# Patient Record
Sex: FEMALE | Race: OTHER | HISPANIC OR LATINO
[De-identification: names, ages, dates, MRNs, and addresses within clinical notes are randomized per-mention and may not be internally consistent; named-entity substitution may affect disease eponyms.]

---

## 2019-08-07 ENCOUNTER — RESULT REVIEW (OUTPATIENT)
Age: 54
End: 2019-08-07

## 2019-08-15 ENCOUNTER — FORM ENCOUNTER (OUTPATIENT)
Age: 54
End: 2019-08-15

## 2021-06-11 PROBLEM — Z00.00 ENCOUNTER FOR PREVENTIVE HEALTH EXAMINATION: Status: ACTIVE | Noted: 2021-06-11

## 2021-06-30 DIAGNOSIS — Z80.49 FAMILY HISTORY OF MALIGNANT NEOPLASM OF OTHER GENITAL ORGANS: ICD-10-CM

## 2021-06-30 DIAGNOSIS — Z85.3 PERSONAL HISTORY OF MALIGNANT NEOPLASM OF BREAST: ICD-10-CM

## 2021-06-30 DIAGNOSIS — Z80.41 FAMILY HISTORY OF MALIGNANT NEOPLASM OF OVARY: ICD-10-CM

## 2021-06-30 DIAGNOSIS — Z12.39 ENCOUNTER FOR OTHER SCREENING FOR MALIGNANT NEOPLASM OF BREAST: ICD-10-CM

## 2021-06-30 DIAGNOSIS — Z78.9 OTHER SPECIFIED HEALTH STATUS: ICD-10-CM

## 2021-06-30 DIAGNOSIS — R92.2 INCONCLUSIVE MAMMOGRAM: ICD-10-CM

## 2021-06-30 DIAGNOSIS — Z85.850 PERSONAL HISTORY OF MALIGNANT NEOPLASM OF THYROID: ICD-10-CM

## 2021-07-01 ENCOUNTER — APPOINTMENT (OUTPATIENT)
Dept: BREAST CENTER | Facility: CLINIC | Age: 56
End: 2021-07-01
Payer: COMMERCIAL

## 2021-07-01 ENCOUNTER — NON-APPOINTMENT (OUTPATIENT)
Age: 56
End: 2021-07-01

## 2021-07-01 VITALS
BODY MASS INDEX: 24.55 KG/M2 | WEIGHT: 130 LBS | DIASTOLIC BLOOD PRESSURE: 83 MMHG | HEART RATE: 76 BPM | SYSTOLIC BLOOD PRESSURE: 119 MMHG | HEIGHT: 61 IN | OXYGEN SATURATION: 98 %

## 2021-07-01 DIAGNOSIS — Z85.3 PERSONAL HISTORY OF MALIGNANT NEOPLASM OF BREAST: ICD-10-CM

## 2021-07-01 DIAGNOSIS — Z90.11 ACQUIRED ABSENCE OF RIGHT BREAST AND NIPPLE: ICD-10-CM

## 2021-07-01 DIAGNOSIS — Z80.3 FAMILY HISTORY OF MALIGNANT NEOPLASM OF BREAST: ICD-10-CM

## 2021-07-01 PROCEDURE — 99213 OFFICE O/P EST LOW 20 MIN: CPT

## 2021-07-01 PROCEDURE — 99072 ADDL SUPL MATRL&STAF TM PHE: CPT

## 2021-07-01 RX ORDER — LEVOTHYROXINE SODIUM 137 UG/1
TABLET ORAL
Refills: 0 | Status: ACTIVE | COMMUNITY

## 2021-07-01 NOTE — ASSESSMENT
[FreeTextEntry1] : The patient is a 55-year-old G5, P3 postmenopausal female originally from the [place].  She underwent menarche at age 11 and had her first child at age 47.  She never took any hormone replacement therapy after menopause.  She has no family history of breast or ovarian cancer but her maternal great grandmother had uterine or ovarian cancer.  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  She had 2 negative sentinel lymph nodes and did have a small residual focus of low-grade DCIS.  A separate anterior margin also showed a small foci of DCIS but this is just been followed.  She had an augmentation on the left breast for symmetry.  The DCIS was ER positive and [place] weakly positive and she was placed on tamoxifen but stopped on her own due to some LFT abnormalities.  She had the right implant exchange for symmetry reasons in the past and did have fat injections on the right as well.  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  She had a thyroidectomy in 2011 for thyroid cancer.  On exam, I cannot feel any evidence of recurrence over the right implant and no suspicious findings in the left breast.  Unfortunately her last left breast mammography and ultrasound was from 2019 and she is due for a left breast mammography and ultrasound now.  She is going to try to get this done at [facility].  If that is unchanged and negative, she should follow-up again in 1 year in July 2022 and continue with left breast mammography and ultrasound.

## 2021-07-01 NOTE — HISTORY OF PRESENT ILLNESS
[FreeTextEntry1] : The patient is a 55-year-old G5, P3 postmenopausal female originally from the [place].  She underwent menarche at age 11 and had her first child at age 47.  She never took any hormone replacement therapy after menopause.  She has no family history of breast or ovarian cancer but her maternal great grandmother had uterine or ovarian cancer.  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  She had 2 negative sentinel lymph nodes and did have a small residual focus of low-grade DCIS.  A separate anterior margin also showed a small foci of DCIS but this is just been followed.  She had an augmentation on the left breast for symmetry.  The DCIS was ER positive and [place] weakly positive and she was placed on tamoxifen but stopped on her own due to some LFT abnormalities.  She had the right implant exchange for symmetry reasons in the past and did have fat injections on the right as well.  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  She had a thyroidectomy in 2011 for thyroid cancer.  She comes in for routine yearly clinical exam and gets yearly left breast mammography and ultrasound.

## 2021-07-01 NOTE — REASON FOR VISIT
[Follow-Up: _____] : a [unfilled] follow-up visit [FreeTextEntry1] : The patient comes in with a history of being diagnosed with DCIS in the outer aspect of the right breast and initially had positive margins on wide excision and underwent a right breast nipple sparing mastectomy and direct implant reconstruction in November 2009 and had 3 - sentinel lymph nodes and final pathology only showed further low-grade DCIS.  She had augmentation of the left breast for symmetry and the DCIS was ER/TX positive and she was placed on tamoxifen but stopped after LFT abnormalities.  She genetic tested negative and comes in for routine yearly exam and yearly mammography and ultrasound.

## 2021-07-01 NOTE — PAST MEDICAL HISTORY
[Postmenopausal] : The patient is postmenopausal [Menarche Age ____] : age at menarche was [unfilled] [Total Preg ___] : G[unfilled] [Live Births ___] : P[unfilled]  [Age At Live Birth ___] : Age at live birth: [unfilled] [Menopause Age____] : age at menopause was [unfilled] [Premature ___] : Premature: [unfilled] [History of Hormone Replacement Treatment] : has no history of hormone replacement treatment

## 2021-07-01 NOTE — PHYSICAL EXAM
[Normocephalic] : normocephalic [Atraumatic] : atraumatic [EOMI] : extra ocular movement intact [Supple] : supple [No Supraclavicular Adenopathy] : no supraclavicular adenopathy [No Cervical Adenopathy] : no cervical adenopathy [Examined in the supine and seated position] : examined in the supine and seated position [No dominant masses] : no dominant masses in right breast  [No dominant masses] : no dominant masses left breast [No Nipple Retraction] : no left nipple retraction [No Nipple Discharge] : no left nipple discharge [Breast Mass Right Breast ___cm] : no masses [Breast Mass Left Breast ___cm] : no masses [Breast Nipple Inversion] : nipples not inverted [Breast Nipple Retraction] : nipples not retracted [Breast Nipple Flattening] : nipples not flattened [Breast Nipple Fissures] : nipples not fissured [Breast Abnormal Lactation (Galactorrhea)] : no galactorrhea [Breast Abnormal Secretion Bloody Fluid] : no bloody discharge [Breast Abnormal Secretion Serous Fluid] : no serous discharge [Breast Abnormal Secretion Opalescent Fluid] : no milky discharge [No Axillary Lymphadenopathy] : no left axillary lymphadenopathy [No Edema] : no edema [No Rashes] : no rashes [No Ulceration] : no ulceration [de-identified] : On exam, the patient has the obvious nipple sparing mastectomy on the right side with an implant reconstruction and had augmentation implant on the left side for symmetry.  On palpation, I cannot feel any evidence of recurrence over the right implant and no suspicious findings in the left breast.  The right breast is slightly firmer than the left as expected.  The right breast is also slightly smaller and less ptotic than the left.  The patient has almost normal sensation in the right nipple areolar complex and inferior pole.  She has normal sensation in the superior, medial, and lateral poles as well.  She has no axillary, supraclavicular, or cervical adenopathy. [de-identified] : Status post nipple sparing mastectomy with direct to implant reconstruction with no evidence of recurrence [de-identified] : Status post augmentation implant for symmetry with no suspicious findings

## 2021-09-30 ENCOUNTER — RESULT REVIEW (OUTPATIENT)
Age: 56
End: 2021-09-30

## 2022-06-26 ENCOUNTER — TRANSCRIPTION ENCOUNTER (OUTPATIENT)
Age: 57
End: 2022-06-26

## 2022-10-20 ENCOUNTER — RESULT REVIEW (OUTPATIENT)
Age: 57
End: 2022-10-20

## 2024-02-26 ENCOUNTER — HOSPITAL ENCOUNTER (INPATIENT)
Dept: HOSPITAL 74 - J2C | Age: 59
LOS: 3 days | Discharge: HOME | DRG: 581 | End: 2024-02-29
Attending: NURSE PRACTITIONER | Admitting: PLASTIC SURGERY
Payer: COMMERCIAL

## 2024-02-26 VITALS — BODY MASS INDEX: 22.8 KG/M2
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
## 2024-02-26 DIAGNOSIS — N65.1: ICD-10-CM

## 2024-02-26 DIAGNOSIS — G89.18: ICD-10-CM

## 2024-02-26 DIAGNOSIS — Z85.3: ICD-10-CM

## 2024-02-26 DIAGNOSIS — Z85.850: ICD-10-CM

## 2024-02-26 DIAGNOSIS — T85.44XA: Primary | ICD-10-CM

## 2024-02-26 DIAGNOSIS — I95.1: ICD-10-CM

## 2024-02-26 DIAGNOSIS — E03.9: ICD-10-CM

## 2024-02-26 DIAGNOSIS — D64.9: ICD-10-CM

## 2024-02-26 DIAGNOSIS — H57.89: ICD-10-CM

## 2024-02-26 RX ADMIN — SODIUM CHLORIDE, POTASSIUM CHLORIDE, SODIUM LACTATE AND CALCIUM CHLORIDE SCH MLS/HR: 600; 310; 30; 20 INJECTION, SOLUTION INTRAVENOUS at 16:45

## 2024-02-26 RX ADMIN — DEXTROSE AND SODIUM CHLORIDE SCH MLS/HR: 5; 450 INJECTION, SOLUTION INTRAVENOUS at 19:52

## 2024-02-26 RX ADMIN — POLYVINYL ALCOHOL PRN DROP: 14 SOLUTION/ DROPS OPHTHALMIC at 21:02

## 2024-02-26 RX ADMIN — HEPARIN SODIUM SCH: 5000 INJECTION, SOLUTION INTRAVENOUS; SUBCUTANEOUS at 18:00

## 2024-02-26 RX ADMIN — CEFAZOLIN SCH MLS/HR: 1 INJECTION, POWDER, FOR SOLUTION INTRAVENOUS at 21:01

## 2024-02-26 RX ADMIN — KETOROLAC TROMETHAMINE PRN DROP: 5 SOLUTION OPHTHALMIC at 21:17

## 2024-02-26 RX ADMIN — DOCUSATE SODIUM SCH MG: 100 CAPSULE, LIQUID FILLED ORAL at 21:05

## 2024-02-27 LAB
ALBUMIN SERPL-MCNC: 2.6 G/DL (ref 3.4–5)
ALP SERPL-CCNC: 51 U/L (ref 45–117)
ALT SERPL-CCNC: 17 U/L (ref 13–61)
ANION GAP SERPL CALC-SCNC: 5 MMOL/L (ref 4–13)
AST SERPL-CCNC: 19 U/L (ref 15–37)
BASOPHILS # BLD: 0.3 % (ref 0–2)
BILIRUB SERPL-MCNC: 1 MG/DL (ref 0.2–1)
BUN SERPL-MCNC: 9.2 MG/DL (ref 7–18)
CALCIUM SERPL-MCNC: 7.6 MG/DL (ref 8.5–10.1)
CHLORIDE SERPL-SCNC: 108 MMOL/L (ref 98–107)
CO2 SERPL-SCNC: 29 MMOL/L (ref 21–32)
CREAT SERPL-MCNC: 0.5 MG/DL (ref 0.55–1.3)
DEPRECATED RDW RBC AUTO: 14 % (ref 11.6–15.6)
EOSINOPHIL # BLD: 0.1 % (ref 0–4.5)
GLUCOSE SERPL-MCNC: 120 MG/DL (ref 74–106)
HCT VFR BLD CALC: 28.1 % (ref 32.4–45.2)
HGB BLD-MCNC: 9.3 GM/DL (ref 10.7–15.3)
LYMPHOCYTES # BLD: 19.7 % (ref 8–40)
MAGNESIUM SERPL-MCNC: 1.8 MG/DL (ref 1.8–2.4)
MCH RBC QN AUTO: 26.5 PG (ref 25.7–33.7)
MCHC RBC AUTO-ENTMCNC: 33 G/DL (ref 32–36)
MCV RBC: 80.2 FL (ref 80–96)
MONOCYTES # BLD AUTO: 9.9 % (ref 3.8–10.2)
NEUTROPHILS # BLD: 70 % (ref 42.8–82.8)
PLATELET # BLD AUTO: 258 10^3/UL (ref 134–434)
PMV BLD: 7.5 FL (ref 7.5–11.1)
POTASSIUM SERPLBLD-SCNC: 3.8 MMOL/L (ref 3.5–5.1)
PROT SERPL-MCNC: 4.9 G/DL (ref 6.4–8.2)
RBC # BLD AUTO: 3.5 M/MM3 (ref 3.6–5.2)
SODIUM SERPL-SCNC: 142 MMOL/L (ref 136–145)
WBC # BLD AUTO: 5.7 K/MM3 (ref 4–10)

## 2024-02-27 RX ADMIN — MAGNESIUM OXIDE TAB 400 MG (241.3 MG ELEMENTAL MG) SCH MG: 400 (241.3 MG) TAB at 09:08

## 2024-02-27 RX ADMIN — ONDANSETRON ONE MG: 2 INJECTION INTRAMUSCULAR; INTRAVENOUS at 09:15

## 2024-02-27 RX ADMIN — CALCIUM SCH MG: 500 TABLET ORAL at 09:09

## 2024-02-27 RX ADMIN — Medication SCH UNITS: at 09:09

## 2024-02-27 RX ADMIN — DEXTROSE AND SODIUM CHLORIDE SCH MLS/HR: 5; 450 INJECTION, SOLUTION INTRAVENOUS at 13:02

## 2024-02-27 RX ADMIN — ACETAMINOPHEN PRN MG: 10 INJECTION, SOLUTION INTRAVENOUS at 13:01

## 2024-02-27 RX ADMIN — ONDANSETRON PRN MG: 4 TABLET, ORALLY DISINTEGRATING ORAL at 15:10

## 2024-02-27 RX ADMIN — ASPIRIN 325 MG ORAL TABLET SCH MG: 325 PILL ORAL at 09:09

## 2024-02-27 RX ADMIN — LEVOTHYROXINE SODIUM SCH MCG: 100 TABLET ORAL at 06:20

## 2024-02-27 RX ADMIN — MULTIVITAMIN TABLET SCH TAB: TABLET at 09:08

## 2024-02-28 VITALS — RESPIRATION RATE: 18 BRPM

## 2024-02-28 LAB
ALBUMIN SERPL-MCNC: 2.3 G/DL (ref 3.4–5)
ALP SERPL-CCNC: 47 U/L (ref 45–117)
ALT SERPL-CCNC: 16 U/L (ref 13–61)
ANION GAP SERPL CALC-SCNC: 5 MMOL/L (ref 4–13)
AST SERPL-CCNC: 20 U/L (ref 15–37)
BASOPHILS # BLD: 0.6 % (ref 0–2)
BILIRUB SERPL-MCNC: 0.8 MG/DL (ref 0.2–1)
BUN SERPL-MCNC: 7.9 MG/DL (ref 7–18)
CALCIUM SERPL-MCNC: 7.8 MG/DL (ref 8.5–10.1)
CHLORIDE SERPL-SCNC: 109 MMOL/L (ref 98–107)
CO2 SERPL-SCNC: 31 MMOL/L (ref 21–32)
CREAT SERPL-MCNC: 0.5 MG/DL (ref 0.55–1.3)
DEPRECATED RDW RBC AUTO: 14.2 % (ref 11.6–15.6)
EOSINOPHIL # BLD: 1.1 % (ref 0–4.5)
GLUCOSE SERPL-MCNC: 103 MG/DL (ref 74–106)
HCT VFR BLD CALC: 28 % (ref 32.4–45.2)
HGB BLD-MCNC: 9 GM/DL (ref 10.7–15.3)
LYMPHOCYTES # BLD: 21.1 % (ref 8–40)
MAGNESIUM SERPL-MCNC: 2 MG/DL (ref 1.8–2.4)
MCH RBC QN AUTO: 26.3 PG (ref 25.7–33.7)
MCHC RBC AUTO-ENTMCNC: 32.3 G/DL (ref 32–36)
MCV RBC: 81.3 FL (ref 80–96)
MONOCYTES # BLD AUTO: 11.9 % (ref 3.8–10.2)
NEUTROPHILS # BLD: 65.3 % (ref 42.8–82.8)
PHOSPHATE SERPL-MCNC: 2.1 MG/DL (ref 2.5–4.9)
PLATELET # BLD AUTO: 249 10^3/UL (ref 134–434)
PMV BLD: 7.4 FL (ref 7.5–11.1)
POTASSIUM SERPLBLD-SCNC: 3.7 MMOL/L (ref 3.5–5.1)
PROT SERPL-MCNC: 5 G/DL (ref 6.4–8.2)
RBC # BLD AUTO: 3.44 M/MM3 (ref 3.6–5.2)
SODIUM SERPL-SCNC: 144 MMOL/L (ref 136–145)
WBC # BLD AUTO: 6.2 K/MM3 (ref 4–10)

## 2024-02-28 PROCEDURE — 0HNT0ZZ RELEASE RIGHT BREAST, OPEN APPROACH: ICD-10-PCS

## 2024-02-28 PROCEDURE — 07B80ZX EXCISION OF RIGHT INTERNAL MAMMARY LYMPHATIC, OPEN APPROACH, DIAGNOSTIC: ICD-10-PCS

## 2024-02-28 PROCEDURE — 0HRT077 REPLACEMENT OF RIGHT BREAST USING DEEP INFERIOR EPIGASTRIC ARTERY PERFORATOR FLAP, OPEN APPROACH: ICD-10-PCS

## 2024-02-28 PROCEDURE — 4A1GXSH MONITORING OF SKIN AND BREAST VASCULAR PERFUSION USING INDOCYANINE GREEN DYE, EXTERNAL APPROACH: ICD-10-PCS

## 2024-02-28 RX ADMIN — CHOLECALCIFEROL TAB 10 MCG (400 UNIT) SCH UNIT: 10 TAB at 11:24

## 2024-02-28 RX ADMIN — POTASSIUM & SODIUM PHOSPHATES POWDER PACK 280-160-250 MG ONE PACKET: 280-160-250 PACK at 09:50

## 2024-02-29 VITALS — TEMPERATURE: 98.6 F | DIASTOLIC BLOOD PRESSURE: 66 MMHG | HEART RATE: 87 BPM | SYSTOLIC BLOOD PRESSURE: 103 MMHG

## 2024-02-29 LAB
ALBUMIN SERPL-MCNC: 2.2 G/DL (ref 3.4–5)
ALP SERPL-CCNC: 52 U/L (ref 45–117)
ALT SERPL-CCNC: 16 U/L (ref 13–61)
ANION GAP SERPL CALC-SCNC: 6 MMOL/L (ref 4–13)
AST SERPL-CCNC: 22 U/L (ref 15–37)
BILIRUB SERPL-MCNC: 0.7 MG/DL (ref 0.2–1)
BUN SERPL-MCNC: 8.3 MG/DL (ref 7–18)
CALCIUM SERPL-MCNC: 7.6 MG/DL (ref 8.5–10.1)
CHLORIDE SERPL-SCNC: 107 MMOL/L (ref 98–107)
CO2 SERPL-SCNC: 30 MMOL/L (ref 21–32)
CREAT SERPL-MCNC: 0.4 MG/DL (ref 0.55–1.3)
DEPRECATED RDW RBC AUTO: 14 % (ref 11.6–15.6)
GLUCOSE SERPL-MCNC: 89 MG/DL (ref 74–106)
HCT VFR BLD CALC: 26.8 % (ref 32.4–45.2)
HGB BLD-MCNC: 8.8 GM/DL (ref 10.7–15.3)
MCH RBC QN AUTO: 26.5 PG (ref 25.7–33.7)
MCHC RBC AUTO-ENTMCNC: 32.8 G/DL (ref 32–36)
MCV RBC: 80.9 FL (ref 80–96)
PLATELET # BLD AUTO: 260 10^3/UL (ref 134–434)
PMV BLD: 7.3 FL (ref 7.5–11.1)
POTASSIUM SERPLBLD-SCNC: 3.9 MMOL/L (ref 3.5–5.1)
PROT SERPL-MCNC: 5.4 G/DL (ref 6.4–8.2)
RBC # BLD AUTO: 3.31 M/MM3 (ref 3.6–5.2)
SODIUM SERPL-SCNC: 143 MMOL/L (ref 136–145)
WBC # BLD AUTO: 6.5 K/MM3 (ref 4–10)

## 2024-02-29 RX ADMIN — MULTIVITAMIN TABLET SCH TAB: TABLET at 10:22

## 2024-02-29 RX ADMIN — HEPARIN SODIUM SCH UNIT: 5000 INJECTION, SOLUTION INTRAVENOUS; SUBCUTANEOUS at 15:02

## 2024-02-29 RX ADMIN — CHOLECALCIFEROL TAB 10 MCG (400 UNIT) SCH UNIT: 10 TAB at 10:22

## 2024-02-29 RX ADMIN — ASPIRIN 325 MG ORAL TABLET SCH MG: 325 PILL ORAL at 10:22

## 2024-02-29 RX ADMIN — CALCIUM SCH MG: 500 TABLET ORAL at 10:21

## 2024-02-29 RX ADMIN — LEVOTHYROXINE SODIUM SCH: 100 TABLET ORAL at 07:39

## 2024-02-29 RX ADMIN — DOCUSATE SODIUM SCH MG: 100 CAPSULE, LIQUID FILLED ORAL at 10:22

## 2024-02-29 RX ADMIN — MAGNESIUM OXIDE TAB 400 MG (241.3 MG ELEMENTAL MG) SCH MG: 400 (241.3 MG) TAB at 10:22

## 2025-05-27 ENCOUNTER — NON-APPOINTMENT (OUTPATIENT)
Age: 60
End: 2025-05-27

## 2025-05-27 ENCOUNTER — APPOINTMENT (OUTPATIENT)
Dept: BREAST CENTER | Facility: CLINIC | Age: 60
End: 2025-05-27
Payer: COMMERCIAL

## 2025-05-27 VITALS
DIASTOLIC BLOOD PRESSURE: 74 MMHG | HEART RATE: 88 BPM | SYSTOLIC BLOOD PRESSURE: 107 MMHG | HEIGHT: 61 IN | WEIGHT: 128 LBS | OXYGEN SATURATION: 100 % | BODY MASS INDEX: 24.17 KG/M2

## 2025-05-27 DIAGNOSIS — Z85.3 PERSONAL HISTORY OF MALIGNANT NEOPLASM OF BREAST: ICD-10-CM

## 2025-05-27 DIAGNOSIS — Z80.3 FAMILY HISTORY OF MALIGNANT NEOPLASM OF BREAST: ICD-10-CM

## 2025-05-27 DIAGNOSIS — R92.30 DENSE BREASTS, UNSPECIFIED: ICD-10-CM

## 2025-05-27 DIAGNOSIS — Z90.11 ACQUIRED ABSENCE OF RIGHT BREAST AND NIPPLE: ICD-10-CM

## 2025-05-27 DIAGNOSIS — Z12.39 ENCOUNTER FOR OTHER SCREENING FOR MALIGNANT NEOPLASM OF BREAST: ICD-10-CM

## 2025-05-27 PROCEDURE — 99203 OFFICE O/P NEW LOW 30 MIN: CPT

## 2025-06-05 ENCOUNTER — RESULT REVIEW (OUTPATIENT)
Age: 60
End: 2025-06-05